# Patient Record
Sex: MALE | Race: BLACK OR AFRICAN AMERICAN | NOT HISPANIC OR LATINO | Employment: OTHER | URBAN - METROPOLITAN AREA
[De-identification: names, ages, dates, MRNs, and addresses within clinical notes are randomized per-mention and may not be internally consistent; named-entity substitution may affect disease eponyms.]

---

## 2022-05-04 ENCOUNTER — PATIENT OUTREACH (OUTPATIENT)
Dept: ADMINISTRATIVE | Facility: CLINIC | Age: 67
End: 2022-05-04

## 2022-05-04 NOTE — PROGRESS NOTES
Pt on meds from discharge that are not in chart for reconciliation; Pt read following meds  Cetirizine 10mg once a day  Breo ellipta inhaler   Guailenesin or Mucinex  Albuterol inhaler 3mg nebulizer - pt has med  Methylprednisone    Pt will  medications today. Has financial issues Breo inhaler  Atovastin 10 mg every day  Telmiartin 80 mg once a day  Metropolol 25 mg bid  Spirolactone 50 mg every day  Eliquis 5 mg bid  Flonase and Claritan as needed for allergies    C3 nurse spoke with JOSÉ ANTONIO Stevenson Jr.   for a TCC post hospital discharge follow up call. The patient does not have a scheduled HOSFU appointment with Jeramie Azul MD   within 7-14 days post hospital discharge date 5/3. C3 nurse was unable to schedule HOSFU appointment in [facility].  Non [facility] PCP